# Patient Record
Sex: MALE | Race: OTHER | HISPANIC OR LATINO
[De-identification: names, ages, dates, MRNs, and addresses within clinical notes are randomized per-mention and may not be internally consistent; named-entity substitution may affect disease eponyms.]

---

## 2022-04-07 ENCOUNTER — TRANSCRIPTION ENCOUNTER (OUTPATIENT)
Age: 47
End: 2022-04-07

## 2022-04-08 PROBLEM — Z00.00 ENCOUNTER FOR PREVENTIVE HEALTH EXAMINATION: Status: ACTIVE | Noted: 2022-04-08

## 2022-04-12 ENCOUNTER — OUTPATIENT (OUTPATIENT)
Dept: OUTPATIENT SERVICES | Facility: HOSPITAL | Age: 47
LOS: 1 days | End: 2022-04-12
Payer: COMMERCIAL

## 2022-04-12 ENCOUNTER — APPOINTMENT (OUTPATIENT)
Dept: ORTHOPEDIC SURGERY | Facility: CLINIC | Age: 47
End: 2022-04-12
Payer: COMMERCIAL

## 2022-04-12 ENCOUNTER — TRANSCRIPTION ENCOUNTER (OUTPATIENT)
Age: 47
End: 2022-04-12

## 2022-04-12 VITALS
TEMPERATURE: 98 F | RESPIRATION RATE: 12 BRPM | OXYGEN SATURATION: 98 % | WEIGHT: 186.95 LBS | HEART RATE: 100 BPM | SYSTOLIC BLOOD PRESSURE: 137 MMHG | HEIGHT: 66 IN | DIASTOLIC BLOOD PRESSURE: 89 MMHG

## 2022-04-12 VITALS — WEIGHT: 175 LBS | HEIGHT: 65 IN | BODY MASS INDEX: 29.16 KG/M2

## 2022-04-12 DIAGNOSIS — S82.851A DISPLACED TRIMALLEOLAR FRACTURE OF RIGHT LOWER LEG, INITIAL ENCOUNTER FOR CLOSED FRACTURE: ICD-10-CM

## 2022-04-12 DIAGNOSIS — Z01.818 ENCOUNTER FOR OTHER PREPROCEDURAL EXAMINATION: ICD-10-CM

## 2022-04-12 LAB
ANION GAP SERPL CALC-SCNC: 8 MMOL/L — SIGNIFICANT CHANGE UP (ref 5–17)
BUN SERPL-MCNC: 13 MG/DL — SIGNIFICANT CHANGE UP (ref 7–23)
CALCIUM SERPL-MCNC: 9.2 MG/DL — SIGNIFICANT CHANGE UP (ref 8.4–10.5)
CHLORIDE SERPL-SCNC: 101 MMOL/L — SIGNIFICANT CHANGE UP (ref 96–108)
CO2 SERPL-SCNC: 27 MMOL/L — SIGNIFICANT CHANGE UP (ref 22–31)
CREAT SERPL-MCNC: 1.07 MG/DL — SIGNIFICANT CHANGE UP (ref 0.5–1.3)
EGFR: 87 ML/MIN/1.73M2 — SIGNIFICANT CHANGE UP
GLUCOSE SERPL-MCNC: 97 MG/DL — SIGNIFICANT CHANGE UP (ref 70–99)
HCT VFR BLD CALC: 44.8 % — SIGNIFICANT CHANGE UP (ref 39–50)
HGB BLD-MCNC: 15 G/DL — SIGNIFICANT CHANGE UP (ref 13–17)
MCHC RBC-ENTMCNC: 29.8 PG — SIGNIFICANT CHANGE UP (ref 27–34)
MCHC RBC-ENTMCNC: 33.5 GM/DL — SIGNIFICANT CHANGE UP (ref 32–36)
MCV RBC AUTO: 88.9 FL — SIGNIFICANT CHANGE UP (ref 80–100)
NRBC # BLD: 0 /100 WBCS — SIGNIFICANT CHANGE UP (ref 0–0)
PLATELET # BLD AUTO: 276 K/UL — SIGNIFICANT CHANGE UP (ref 150–400)
POTASSIUM SERPL-MCNC: 3.7 MMOL/L — SIGNIFICANT CHANGE UP (ref 3.5–5.3)
POTASSIUM SERPL-SCNC: 3.7 MMOL/L — SIGNIFICANT CHANGE UP (ref 3.5–5.3)
RBC # BLD: 5.04 M/UL — SIGNIFICANT CHANGE UP (ref 4.2–5.8)
RBC # FLD: 13 % — SIGNIFICANT CHANGE UP (ref 10.3–14.5)
SARS-COV-2 RNA SPEC QL NAA+PROBE: SIGNIFICANT CHANGE UP
SODIUM SERPL-SCNC: 136 MMOL/L — SIGNIFICANT CHANGE UP (ref 135–145)
WBC # BLD: 8.66 K/UL — SIGNIFICANT CHANGE UP (ref 3.8–10.5)
WBC # FLD AUTO: 8.66 K/UL — SIGNIFICANT CHANGE UP (ref 3.8–10.5)

## 2022-04-12 PROCEDURE — 85027 COMPLETE CBC AUTOMATED: CPT

## 2022-04-12 PROCEDURE — G0463: CPT

## 2022-04-12 PROCEDURE — 36415 COLL VENOUS BLD VENIPUNCTURE: CPT

## 2022-04-12 PROCEDURE — 93005 ELECTROCARDIOGRAM TRACING: CPT

## 2022-04-12 PROCEDURE — 87635 SARS-COV-2 COVID-19 AMP PRB: CPT

## 2022-04-12 PROCEDURE — 99204 OFFICE O/P NEW MOD 45 MIN: CPT

## 2022-04-12 PROCEDURE — 93010 ELECTROCARDIOGRAM REPORT: CPT

## 2022-04-12 PROCEDURE — 80048 BASIC METABOLIC PNL TOTAL CA: CPT

## 2022-04-12 NOTE — ASSESSMENT
[FreeTextEntry1] : nwb in cam boot - rx given\par xeroform to blisters\par given displacement, recommend surgical treatmetn. discussed pros/cons, risks/benefits, and recovery\par swelling laterally well controlled and appears amenable to surgery at this time\par all questions answered and would like to proceed\par \par The pros, cons, risks, benefits, alternatives have been discussed.  The risks include but are not limited to infection, bleeding, injury to small nerves and blood vessels, pain, stiffness, progression, over correction, under correction, recurrence, hardware failure, need for additional procedures, dvt, PE, amputation and death. Expected recovery time was discussed. All the patient's questions were answered and they would like to proceed.\par

## 2022-04-12 NOTE — H&P PST ADULT - HISTORY OF PRESENT ILLNESS
47 yo male reports fall injury 4/7/2022 with diagnosis of displaced right trimalleloar fracture.  Right foot splinted; using crutches.  Pain rates 6/10 at worst.  He is scheduled for right ankle open reduction internal fixation trimalleolar fracture distal tibiofibular disruption on 4/13/2022 @ [facility].

## 2022-04-12 NOTE — ASU PATIENT PROFILE, ADULT - FALL HARM RISK - RISK INTERVENTIONS

## 2022-04-12 NOTE — H&P PST ADULT - NSICDXFAMILYHX_GEN_ALL_CORE_FT
FAMILY HISTORY:  Mother  Still living? No  Family history of stomach cancer, Age at diagnosis: Age Unknown

## 2022-04-12 NOTE — PHYSICAL EXAM
[5___] : [facility] 5[unfilled]/5 [2+] : dorsalis pedis pulse: 2+ [Right] : right ankle [] : no achilles tendon tenderness [FreeTextEntry3] : multiple medial serosang fx blisters. lateral swelling well controlled w/ skin wrinkling present [FreeTextEntry9] : zachery buenrostro trimalleolar fx with subluxation

## 2022-04-12 NOTE — H&P PST ADULT - MEDICATION ADMINISTRATION INFO, PROFILE
Alert-The patient is alert, awake and responds to voice. The patient is oriented to time, place, and person. The triage nurse is able to obtain subjective information. no concerns

## 2022-04-12 NOTE — H&P PST ADULT - NSICDXPASTMEDICALHX_GEN_ALL_CORE_FT
PAST MEDICAL HISTORY:  COVID-19 vaccine series completed     Trimalleolar fracture of right ankle

## 2022-04-12 NOTE — H&P PST ADULT - PROBLEM SELECTOR PLAN 1
Right ankle open reduction internal fixation trimalleolar fracture distal tibiofibular disruption is planned for 4/13/2022  Covid testing today  pre op instructions were reviewed; best wishes offered

## 2022-04-12 NOTE — HISTORY OF PRESENT ILLNESS
[6] : 6 [Intermittent] : intermittent [de-identified] : 4/12/22: fall on stairs 5 days ago w/ ankle pain. went to  and placed in splint. no prior ankle probs. no dm/tob.  [] : no [FreeTextEntry1] : right fibula [FreeTextEntry3] : 4/7/22 [de-identified] : at night  [de-identified] : splint and crutches [de-identified] : urgent care [de-identified] : xray

## 2022-04-12 NOTE — H&P PST ADULT - ASSESSMENT
45 yo male is scheduled for right ankle open reduction internal fixation trimalleolar fracture distal tibiofibular disruption on 4/13/2022

## 2022-04-13 ENCOUNTER — APPOINTMENT (OUTPATIENT)
Dept: ORTHOPEDIC SURGERY | Facility: HOSPITAL | Age: 47
End: 2022-04-13
Payer: COMMERCIAL

## 2022-04-13 ENCOUNTER — TRANSCRIPTION ENCOUNTER (OUTPATIENT)
Age: 47
End: 2022-04-13

## 2022-04-13 ENCOUNTER — OUTPATIENT (OUTPATIENT)
Dept: OUTPATIENT SERVICES | Facility: HOSPITAL | Age: 47
LOS: 1 days | End: 2022-04-13
Payer: COMMERCIAL

## 2022-04-13 VITALS
OXYGEN SATURATION: 100 % | DIASTOLIC BLOOD PRESSURE: 92 MMHG | WEIGHT: 188.05 LBS | SYSTOLIC BLOOD PRESSURE: 145 MMHG | HEIGHT: 65 IN | RESPIRATION RATE: 20 BRPM | TEMPERATURE: 98 F | HEART RATE: 96 BPM

## 2022-04-13 VITALS
TEMPERATURE: 98 F | HEART RATE: 86 BPM | DIASTOLIC BLOOD PRESSURE: 83 MMHG | OXYGEN SATURATION: 96 % | SYSTOLIC BLOOD PRESSURE: 125 MMHG | RESPIRATION RATE: 16 BRPM

## 2022-04-13 DIAGNOSIS — S82.851A DISPLACED TRIMALLEOLAR FRACTURE OF RIGHT LOWER LEG, INITIAL ENCOUNTER FOR CLOSED FRACTURE: ICD-10-CM

## 2022-04-13 PROCEDURE — 73610 X-RAY EXAM OF ANKLE: CPT | Mod: 26,RT

## 2022-04-13 PROCEDURE — 27829 TREAT LOWER LEG JOINT: CPT | Mod: 59,RT

## 2022-04-13 PROCEDURE — 27829 TREAT LOWER LEG JOINT: CPT | Mod: RT

## 2022-04-13 PROCEDURE — 27822 TREATMENT OF ANKLE FRACTURE: CPT | Mod: RT

## 2022-04-13 PROCEDURE — C1713: CPT

## 2022-04-13 PROCEDURE — 76000 FLUOROSCOPY <1 HR PHYS/QHP: CPT

## 2022-04-13 PROCEDURE — 29515 APPLICATION SHORT LEG SPLINT: CPT | Mod: 59,RT

## 2022-04-13 DEVICE — IMPLANTABLE DEVICE: Type: IMPLANTABLE DEVICE | Status: FUNCTIONAL

## 2022-04-13 DEVICE — SCREW LOKG 3.5X12MM: Type: IMPLANTABLE DEVICE | Status: FUNCTIONAL

## 2022-04-13 DEVICE — PIN FIXATION TEMP 1.1MM SM: Type: IMPLANTABLE DEVICE | Status: FUNCTIONAL

## 2022-04-13 DEVICE — SCREW CORT LO PROF 3.5X12MM: Type: IMPLANTABLE DEVICE | Status: FUNCTIONAL

## 2022-04-13 RX ORDER — ACETAMINOPHEN 500 MG
1000 TABLET ORAL ONCE
Refills: 0 | Status: COMPLETED | OUTPATIENT
Start: 2022-04-13 | End: 2022-04-13

## 2022-04-13 RX ORDER — HYDROMORPHONE HYDROCHLORIDE 2 MG/ML
0.5 INJECTION INTRAMUSCULAR; INTRAVENOUS; SUBCUTANEOUS
Refills: 0 | Status: DISCONTINUED | OUTPATIENT
Start: 2022-04-13 | End: 2022-04-14

## 2022-04-13 RX ORDER — APREPITANT 80 MG/1
40 CAPSULE ORAL ONCE
Refills: 0 | Status: COMPLETED | OUTPATIENT
Start: 2022-04-13 | End: 2022-04-13

## 2022-04-13 RX ORDER — CEFAZOLIN SODIUM 1 G
2000 VIAL (EA) INJECTION ONCE
Refills: 0 | Status: COMPLETED | OUTPATIENT
Start: 2022-04-13 | End: 2022-04-13

## 2022-04-13 RX ORDER — ONDANSETRON 8 MG/1
4 TABLET, FILM COATED ORAL ONCE
Refills: 0 | Status: DISCONTINUED | OUTPATIENT
Start: 2022-04-13 | End: 2022-04-14

## 2022-04-13 RX ORDER — CHLORHEXIDINE GLUCONATE 213 G/1000ML
1 SOLUTION TOPICAL ONCE
Refills: 0 | Status: COMPLETED | OUTPATIENT
Start: 2022-04-13 | End: 2022-04-13

## 2022-04-13 RX ORDER — OXYCODONE HYDROCHLORIDE 5 MG/1
5 TABLET ORAL ONCE
Refills: 0 | Status: DISCONTINUED | OUTPATIENT
Start: 2022-04-13 | End: 2022-04-14

## 2022-04-13 RX ORDER — SODIUM CHLORIDE 9 MG/ML
1000 INJECTION, SOLUTION INTRAVENOUS
Refills: 0 | Status: DISCONTINUED | OUTPATIENT
Start: 2022-04-13 | End: 2022-04-14

## 2022-04-13 RX ORDER — ASPIRIN/CALCIUM CARB/MAGNESIUM 324 MG
1 TABLET ORAL
Qty: 30 | Refills: 0
Start: 2022-04-13 | End: 2022-05-12

## 2022-04-13 RX ADMIN — APREPITANT 40 MILLIGRAM(S): 80 CAPSULE ORAL at 15:21

## 2022-04-13 RX ADMIN — CHLORHEXIDINE GLUCONATE 1 APPLICATION(S): 213 SOLUTION TOPICAL at 15:22

## 2022-04-13 NOTE — ASU DISCHARGE PLAN (ADULT/PEDIATRIC) - NS MD DC FALL RISK RISK
For information on Fall & Injury Prevention, visit: [email or web address] OR  [email or web address] OR  https://www.cdc.gov/steadi/patient.html

## 2022-04-13 NOTE — BRIEF OPERATIVE NOTE - NSICDXBRIEFPROCEDURE_GEN_ALL_CORE_FT
PROCEDURES:  Open reduction and internal fixation of bimalleolar fracture of right ankle 13-Apr-2022 17:53:16  Karol Jacobs

## 2022-04-13 NOTE — ASU DISCHARGE PLAN (ADULT/PEDIATRIC) - CARE PROVIDER_API CALL
Arthur Bernal)  Orthopaedic Surgery  [address] [place] [address]  Phone: [phone]  Fax: [phone]  Follow Up Time: 1 week

## 2022-04-26 ENCOUNTER — APPOINTMENT (OUTPATIENT)
Dept: ORTHOPEDIC SURGERY | Facility: CLINIC | Age: 47
End: 2022-04-26
Payer: COMMERCIAL

## 2022-04-26 PROBLEM — S82.851A DISPLACED TRIMALLEOLAR FRACTURE OF RIGHT LOWER LEG, INITIAL ENCOUNTER FOR CLOSED FRACTURE: Chronic | Status: ACTIVE | Noted: 2022-04-12

## 2022-04-26 PROBLEM — Z92.29 PERSONAL HISTORY OF OTHER DRUG THERAPY: Chronic | Status: ACTIVE | Noted: 2022-04-12

## 2022-04-26 PROCEDURE — 99024 POSTOP FOLLOW-UP VISIT: CPT

## 2022-04-26 NOTE — HISTORY OF PRESENT ILLNESS
[Intermittent] : intermittent [Sudden] : sudden [5] : 5 [] : no [FreeTextEntry1] : right fibula [FreeTextEntry3] : 4/7/22 [de-identified] : at night  [de-identified] : splint and crutches [de-identified] : urgent care [de-identified] : xray [de-identified] : 4/12/22: fall on stairs 5 days ago w/ ankle pain. went to  and placed in splint. no prior ankle probs. no dm/tob. \par 4/13/22: ORIF ankle\par 4/26/22: no complaints. nwb in splint. denies f/c/drainage

## 2022-04-26 NOTE — ASSESSMENT
[FreeTextEntry1] : nwb in cam boot\par discussed wound care and blister care\par keep incision dry\par tylenol prn\par elevation\par f/up 2 wks w/ ankle xray

## 2022-04-26 NOTE — PHYSICAL EXAM
[5___] : [facility] 5[unfilled]/5 [2+] : dorsalis pedis pulse: 2+ [Right] : right foot and ankle [] : no calf tenderness [FreeTextEntry3] : medial fx blister - sanguinous. no sign of infx [FreeTextEntry9] : limited by pain

## 2022-05-10 ENCOUNTER — APPOINTMENT (OUTPATIENT)
Dept: ORTHOPEDIC SURGERY | Facility: CLINIC | Age: 47
End: 2022-05-10
Payer: COMMERCIAL

## 2022-05-10 VITALS — WEIGHT: 175 LBS | BODY MASS INDEX: 29.16 KG/M2 | HEIGHT: 65 IN

## 2022-05-10 PROCEDURE — 99024 POSTOP FOLLOW-UP VISIT: CPT

## 2022-05-10 PROCEDURE — 73610 X-RAY EXAM OF ANKLE: CPT | Mod: RT

## 2022-05-10 NOTE — IMAGING
[Right] : right ankle [No loss of surgical correlation. Bony alignment acceptable. Hardware in appropriate position] : No loss of surgical correlation. Bony alignment acceptable. Hardware in appropriate position

## 2022-05-10 NOTE — HISTORY OF PRESENT ILLNESS
[Sudden] : sudden [3] : 3 [2] : 2 [Dull/Aching] : dull/aching [Sharp] : sharp [Occasional] : occasional [de-identified] : 4/12/22: fall on stairs 5 days ago w/ ankle pain. went to  and placed in splint. no prior ankle probs. no dm/tob. \par \par 4/13/22: ORIF ankle\par \par 4/26/22: no complaints. nwb in splint. denies f/c/drainage\par \par 5/10/22: no complaints. nwb in boot. denies f/c/drainage [] : no [FreeTextEntry1] : right fibula [FreeTextEntry3] : 4/7/22 [de-identified] : at night  [de-identified] : boot and crutches [de-identified] : rajni  [de-identified] : xray

## 2022-05-10 NOTE — PHYSICAL EXAM
[Right] : right foot and ankle [2+] : dorsalis pedis pulse: 2+ [] : no calf tenderness [5___] : plantar flexion 5[unfilled]/5 [FreeTextEntry3] : medial fx blister - now healed [FreeTextEntry9] : limited by pain

## 2022-05-24 ENCOUNTER — APPOINTMENT (OUTPATIENT)
Dept: ORTHOPEDIC SURGERY | Facility: CLINIC | Age: 47
End: 2022-05-24
Payer: COMMERCIAL

## 2022-05-24 VITALS — BODY MASS INDEX: 29.16 KG/M2 | WEIGHT: 175 LBS | HEIGHT: 65 IN

## 2022-05-24 PROCEDURE — 73610 X-RAY EXAM OF ANKLE: CPT | Mod: RT

## 2022-05-24 PROCEDURE — 99024 POSTOP FOLLOW-UP VISIT: CPT

## 2022-05-24 NOTE — PHYSICAL EXAM
[Right] : right foot and ankle [5___] : [facility] 5[unfilled]/5 [2+] : dorsalis pedis pulse: 2+ [de-identified] : plantar flexion 20 degrees [TWNoteComboBox7] : dorsiflexion 5 degrees [] : no calf tenderness [FreeTextEntry3] : medial fx blister - now healed [FreeTextEntry9] : limited by pain

## 2022-05-24 NOTE — HISTORY OF PRESENT ILLNESS
[3] : 3 [2] : 2 [Sudden] : sudden [0] : 0 [Dull/Aching] : dull/aching [Sharp] : sharp [Occasional] : occasional [de-identified] : 4/12/22: fall on stairs 5 days ago w/ ankle pain. went to  and placed in splint. no prior ankle probs. no dm/tob. \par \par 4/13/22: ORIF ankle\par \par 4/26/22: no complaints. nwb in splint. denies f/c/drainage\par \par 5/10/22: no complaints. nwb in boot. denies f/c/drainage\par \par 5/24/22:  [] : no [FreeTextEntry1] : right fibula [FreeTextEntry3] : 4/7/22 [de-identified] : at night  [de-identified] : boot and crutches [de-identified] : rajni  [de-identified] : xray

## 2022-05-24 NOTE — HISTORY OF PRESENT ILLNESS
[3] : 3 [2] : 2 [Sudden] : sudden [0] : 0 [Dull/Aching] : dull/aching [Sharp] : sharp [Occasional] : occasional [de-identified] : 4/12/22: fall on stairs 5 days ago w/ ankle pain. went to  and placed in splint. no prior ankle probs. no dm/tob. \par \par 4/13/22: ORIF ankle\par \par 4/26/22: no complaints. nwb in splint. denies f/c/drainage\par \par 5/10/22: no complaints. nwb in boot. denies f/c/drainage\par \par 5/24/22:  [] : no [FreeTextEntry1] : right fibula [FreeTextEntry3] : 4/7/22 [de-identified] : at night  [de-identified] : boot and crutches [de-identified] : rajni  [de-identified] : xray

## 2022-05-24 NOTE — ASSESSMENT
[FreeTextEntry1] : wbat in cam boot\par begin PT\par ice/eelvate\par nsaids prn\par f/up 1 month w/ ankle xray

## 2022-05-24 NOTE — PHYSICAL EXAM
[Right] : right foot and ankle [5___] : [facility] 5[unfilled]/5 [2+] : dorsalis pedis pulse: 2+ [de-identified] : plantar flexion 20 degrees [TWNoteComboBox7] : dorsiflexion 5 degrees [] : no calf tenderness [FreeTextEntry3] : medial fx blister - now healed [FreeTextEntry9] : limited by pain

## 2022-06-28 ENCOUNTER — APPOINTMENT (OUTPATIENT)
Dept: ORTHOPEDIC SURGERY | Facility: CLINIC | Age: 47
End: 2022-06-28
Payer: COMMERCIAL

## 2022-06-28 VITALS — BODY MASS INDEX: 29.16 KG/M2 | HEIGHT: 65 IN | WEIGHT: 175 LBS

## 2022-06-28 PROCEDURE — 99024 POSTOP FOLLOW-UP VISIT: CPT

## 2022-06-28 PROCEDURE — 73610 X-RAY EXAM OF ANKLE: CPT | Mod: RT

## 2022-06-28 NOTE — ASSESSMENT
[FreeTextEntry1] : wbat in boot\par continue PT\par discussed syndesmotic screw removal - will discuss furhter at next visit\par f/up 3 wks w/ ankle xray

## 2022-06-28 NOTE — HISTORY OF PRESENT ILLNESS
[Sudden] : sudden [0] : 0 [Dull/Aching] : dull/aching [Sharp] : sharp [Occasional] : occasional [de-identified] : 4/12/22: fall on stairs 5 days ago w/ ankle pain. went to  and placed in splint. no prior ankle probs. no dm/tob. \par \par 4/13/22: ORIF ankle\par \par 4/26/22: no complaints. nwb in splint. denies f/c/drainage\par \par 5/10/22: no complaints. nwb in boot. denies f/c/drainage\par \par 5/24/22 no complaints. nwb in boot and crutches. denies f/c/drainage\par \par 06/28/2022;  No complaints. wb in boot with one crutch. going to PT [] : Post Surgical Visit: no [FreeTextEntry1] : right fibula [de-identified] : at night  [FreeTextEntry3] : 4/7/22 [de-identified] : boot and crutches [de-identified] :  [de-identified] : xray

## 2022-06-28 NOTE — PHYSICAL EXAM
[Right] : right foot and ankle [5___] : [facility] 5[unfilled]/5 [2+] : dorsalis pedis pulse: 2+ [] : no lateral malleolus tenderness [FreeTextEntry3] : medial fx blister - now healed [de-identified] : plantar flexion 30 degrees [TWNoteComboBox7] : dorsiflexion 10 degrees

## 2022-06-28 NOTE — IMAGING
Placed referral for Dr. Alyssa Dubose for pulmonology. Spoke with Alyssa, relayed message per VSP. Pt verbalized understanding. [Right] : right ankle [No loss of surgical correlation. Bony alignment acceptable. Hardware in appropriate position] : No loss of surgical correlation. Bony alignment acceptable. Hardware in appropriate position

## 2022-07-19 ENCOUNTER — APPOINTMENT (OUTPATIENT)
Dept: ORTHOPEDIC SURGERY | Facility: CLINIC | Age: 47
End: 2022-07-19

## 2022-07-19 VITALS — WEIGHT: 175 LBS | BODY MASS INDEX: 29.16 KG/M2 | HEIGHT: 65 IN

## 2022-07-19 PROCEDURE — 99214 OFFICE O/P EST MOD 30 MIN: CPT

## 2022-07-19 PROCEDURE — 73610 X-RAY EXAM OF ANKLE: CPT | Mod: RT

## 2022-07-19 NOTE — HISTORY OF PRESENT ILLNESS
[Sudden] : sudden [0] : 0 [Dull/Aching] : dull/aching [Sharp] : sharp [Occasional] : occasional [de-identified] : 4/12/22: fall on stairs 5 days ago w/ ankle pain. went to UC and placed in splint. no prior ankle probs. no dm/tob. \par \par 4/13/22: ORIF ankle\par \par 4/26/22: no complaints. nwb in splint. denies f/c/drainage\par \par 5/10/22: no complaints. nwb in boot. denies f/c/drainage\par \par 5/24/22 no complaints. nwb in boot and crutches. denies f/c/drainage\par \par 06/28/2022;  No complaints. wb in boot with one crutch. going to PT\par \par 7/19/2022: No sig pain. WB in boot. missed a couple wks of PT due to covid [] : Post Surgical Visit: no [FreeTextEntry1] : right fibula [FreeTextEntry3] : 4/7/22 [de-identified] : at night  [de-identified] : boot [de-identified] :  [de-identified] : xray

## 2022-07-19 NOTE — ASSESSMENT
[FreeTextEntry1] : wbat in boot\par rec resume PT\par discussed syndesmotic screw removal/VERONIKA. all questions answered and would like to proceed.\par \par The pros, cons, risks, benefits, and alternatives have been discussed.  The risks include but are not limited to infection, bleeding, injury to small nerves and blood vessels, pain, stiffness, progression, dvt, PE, amputation and death. Expected recovery time was discussed. All the patient's questions were answered and they would like to proceed.\par

## 2022-07-19 NOTE — PHYSICAL EXAM
[Right] : right foot and ankle [5___[facility]filled[facility]5 [2+] : dorsalis pedis pulse: 2+ [NL (40)] : plantar flexion 40 degrees [] : patient ambulates without assistive device [FreeTextEntry3] : medial fx blister - now healed [de-identified] : plantar flexion 30 degrees [TWNoteComboBox7] : dorsiflexion 10 degrees

## 2022-08-02 ENCOUNTER — OUTPATIENT (OUTPATIENT)
Dept: OUTPATIENT SERVICES | Facility: HOSPITAL | Age: 47
LOS: 1 days | End: 2022-08-02
Payer: COMMERCIAL

## 2022-08-02 VITALS
HEART RATE: 90 BPM | DIASTOLIC BLOOD PRESSURE: 80 MMHG | TEMPERATURE: 97 F | RESPIRATION RATE: 14 BRPM | HEIGHT: 66 IN | OXYGEN SATURATION: 98 % | SYSTOLIC BLOOD PRESSURE: 130 MMHG | WEIGHT: 195.99 LBS

## 2022-08-02 DIAGNOSIS — S82.851D DISPLACED TRIMALLEOLAR FRACTURE OF RIGHT LOWER LEG, SUBSEQUENT ENCOUNTER FOR CLOSED FRACTURE WITH ROUTINE HEALING: ICD-10-CM

## 2022-08-02 DIAGNOSIS — Z01.818 ENCOUNTER FOR OTHER PREPROCEDURAL EXAMINATION: ICD-10-CM

## 2022-08-02 DIAGNOSIS — Z98.890 OTHER SPECIFIED POSTPROCEDURAL STATES: Chronic | ICD-10-CM

## 2022-08-02 DIAGNOSIS — Z97.8 PRESENCE OF OTHER SPECIFIED DEVICES: ICD-10-CM

## 2022-08-02 PROCEDURE — G0463: CPT

## 2022-08-02 PROCEDURE — U0003: CPT

## 2022-08-02 PROCEDURE — U0005: CPT

## 2022-08-02 NOTE — H&P PST ADULT - HISTORY OF PRESENT ILLNESS
46 y/o male who had undergone  right ankle ORIF trimalleolar fracture presents for removal of hardwarew . scheduled for removal 9of hardware right ankle and manipulation under anesthesia  48 y/o male who had undergone right ankle ORIF trimalleolar fracture presents for removal of hardware. Denies any pain  scheduled for removal of hardware right ankle and manipulation under anesthesia on 8/5/22

## 2022-08-02 NOTE — H&P PST ADULT - ASSESSMENT
47 yo male is scheduled for removal of hardware right ankle  on 8/5/22 46yo male is scheduled for removal of hardware right ankle  on 8/5/22

## 2022-08-02 NOTE — H&P PST ADULT - NSICDXPASTMEDICALHX_GEN_ALL_CORE_FT
PAST MEDICAL HISTORY:  2019 novel coronavirus disease (COVID-19) 4/2022    COVID-19 vaccine series completed     Trimalleolar fracture of right ankle

## 2022-08-02 NOTE — H&P PST ADULT - DATE OF LAST VACCINATION
Asthma exacerbation-IV, labs, IVF, nebs, IV steroids, CXR, Magnesium.   Observation unit for repeat nebs, repeat IV steroids, and reevaluation 15-Mar-2021

## 2022-08-02 NOTE — H&P PST ADULT - PROBLEM SELECTOR PLAN 1
Removal of hardware right ankle 0n 8/5/22  COVID test today   Pre op testing instructions scheduled for Removal of hardware right ankle 0n 8/5/22  COVID test done today   Pre op testing instructions

## 2022-08-02 NOTE — H&P PST ADULT - NSICDXPASTSURGICALHX_GEN_ALL_CORE_FT
PAST SURGICAL HISTORY:  S/P ORIF (open reduction internal fixation) fracture right trimalleolar fracture 4/13/2022

## 2022-08-03 ENCOUNTER — TRANSCRIPTION ENCOUNTER (OUTPATIENT)
Age: 47
End: 2022-08-03

## 2022-08-03 PROBLEM — U07.1 COVID-19: Chronic | Status: ACTIVE | Noted: 2022-08-02

## 2022-08-03 LAB — SARS-COV-2 RNA SPEC QL NAA+PROBE: DETECTED

## 2022-08-05 ENCOUNTER — APPOINTMENT (OUTPATIENT)
Dept: ORTHOPEDIC SURGERY | Facility: HOSPITAL | Age: 47
End: 2022-08-05

## 2022-08-18 ENCOUNTER — TRANSCRIPTION ENCOUNTER (OUTPATIENT)
Age: 47
End: 2022-08-18

## 2022-08-19 ENCOUNTER — TRANSCRIPTION ENCOUNTER (OUTPATIENT)
Age: 47
End: 2022-08-19

## 2022-08-19 ENCOUNTER — APPOINTMENT (OUTPATIENT)
Dept: ORTHOPEDIC SURGERY | Facility: HOSPITAL | Age: 47
End: 2022-08-19

## 2022-08-19 ENCOUNTER — OUTPATIENT (OUTPATIENT)
Dept: OUTPATIENT SERVICES | Facility: HOSPITAL | Age: 47
LOS: 1 days | End: 2022-08-19
Payer: COMMERCIAL

## 2022-08-19 VITALS
OXYGEN SATURATION: 95 % | DIASTOLIC BLOOD PRESSURE: 76 MMHG | HEART RATE: 90 BPM | RESPIRATION RATE: 18 BRPM | SYSTOLIC BLOOD PRESSURE: 126 MMHG | TEMPERATURE: 98 F

## 2022-08-19 VITALS
SYSTOLIC BLOOD PRESSURE: 147 MMHG | HEIGHT: 65 IN | TEMPERATURE: 97 F | WEIGHT: 188.94 LBS | RESPIRATION RATE: 20 BRPM | OXYGEN SATURATION: 100 % | DIASTOLIC BLOOD PRESSURE: 88 MMHG | HEART RATE: 85 BPM

## 2022-08-19 DIAGNOSIS — Z01.818 ENCOUNTER FOR OTHER PREPROCEDURAL EXAMINATION: ICD-10-CM

## 2022-08-19 DIAGNOSIS — Z98.890 OTHER SPECIFIED POSTPROCEDURAL STATES: Chronic | ICD-10-CM

## 2022-08-19 DIAGNOSIS — S82.851D DISPLACED TRIMALLEOLAR FRACTURE OF RIGHT LOWER LEG, SUBSEQUENT ENCOUNTER FOR CLOSED FRACTURE WITH ROUTINE HEALING: ICD-10-CM

## 2022-08-19 PROCEDURE — 73610 X-RAY EXAM OF ANKLE: CPT | Mod: 26,RT

## 2022-08-19 PROCEDURE — 27860 FIXATION OF ANKLE JOINT: CPT | Mod: 59,RT

## 2022-08-19 PROCEDURE — 97161 PT EVAL LOW COMPLEX 20 MIN: CPT

## 2022-08-19 PROCEDURE — 20670 REMOVAL IMPLANT SUPERFICIAL: CPT

## 2022-08-19 PROCEDURE — 20680 REMOVAL OF IMPLANT DEEP: CPT | Mod: RT

## 2022-08-19 PROCEDURE — 76000 FLUOROSCOPY <1 HR PHYS/QHP: CPT

## 2022-08-19 RX ORDER — SODIUM CHLORIDE 9 MG/ML
1000 INJECTION, SOLUTION INTRAVENOUS
Refills: 0 | Status: DISCONTINUED | OUTPATIENT
Start: 2022-08-19 | End: 2022-08-19

## 2022-08-19 RX ORDER — IBUPROFEN 200 MG
1 TABLET ORAL
Qty: 20 | Refills: 0
Start: 2022-08-19

## 2022-08-19 RX ORDER — APREPITANT 80 MG/1
40 CAPSULE ORAL ONCE
Refills: 0 | Status: COMPLETED | OUTPATIENT
Start: 2022-08-19 | End: 2022-08-19

## 2022-08-19 RX ORDER — HYDROMORPHONE HYDROCHLORIDE 2 MG/ML
0.5 INJECTION INTRAMUSCULAR; INTRAVENOUS; SUBCUTANEOUS
Refills: 0 | Status: DISCONTINUED | OUTPATIENT
Start: 2022-08-19 | End: 2022-08-19

## 2022-08-19 RX ORDER — CEFAZOLIN SODIUM 1 G
2000 VIAL (EA) INJECTION ONCE
Refills: 0 | Status: COMPLETED | OUTPATIENT
Start: 2022-08-19 | End: 2022-08-19

## 2022-08-19 RX ORDER — CHLORHEXIDINE GLUCONATE 213 G/1000ML
1 SOLUTION TOPICAL ONCE
Refills: 0 | Status: COMPLETED | OUTPATIENT
Start: 2022-08-19 | End: 2022-08-19

## 2022-08-19 RX ORDER — ONDANSETRON 8 MG/1
4 TABLET, FILM COATED ORAL ONCE
Refills: 0 | Status: DISCONTINUED | OUTPATIENT
Start: 2022-08-19 | End: 2022-08-19

## 2022-08-19 RX ADMIN — SODIUM CHLORIDE 75 MILLILITER(S): 9 INJECTION, SOLUTION INTRAVENOUS at 12:08

## 2022-08-19 RX ADMIN — APREPITANT 40 MILLIGRAM(S): 80 CAPSULE ORAL at 08:13

## 2022-08-19 RX ADMIN — CHLORHEXIDINE GLUCONATE 1 APPLICATION(S): 213 SOLUTION TOPICAL at 08:13

## 2022-08-19 NOTE — ASU DISCHARGE PLAN (ADULT/PEDIATRIC) - NS MD DC FALL RISK RISK
For information on Fall & Injury Prevention, visit: [email or web address] OR  [email or web address] OR  https://www.cdc.gov/steadi/patient.html
You can access the FollowMyHealth Patient Portal offered by [facility] by registering at the following website: [email or web address]. By joining [employer]’s FollowMyHealth portal, you will also be able to view your health information using other applications (apps) compatible with our system.

## 2022-08-19 NOTE — BRIEF OPERATIVE NOTE - NSICDXBRIEFPROCEDURE_GEN_ALL_CORE_FT
PROCEDURES:  Removal of implanted or hardware from ankle 19-Aug-2022 11:36:57 Right syndesmotic screw Karol Jacobs

## 2022-08-19 NOTE — ASU DISCHARGE PLAN (ADULT/PEDIATRIC) - ASU DC SPECIAL INSTRUCTIONSFT
May walk with full weight on right foot in a surgical shoe as tolerated.   Elevate right foot on blankets above heart level (toes above the nose) for 1 hour 3 times a day.  Apply ice pack to  foot for 30 minutes every 3 hours daily.  Keep bandage clean and dry daily.  Cover right foot in shower daily with plastic bag & tape ( or cast bag ) to keep dry.  Wear protective shoe on  foot daily  See the Surgeon in the office in 7-10 days.  Call the Surgeon for fever, severe foot pain. May walk with full weight on right foot in a surgical shoe as tolerated.   Elevate right foot on blankets above heart level (toes above the nose) for 1 hour 3 times a day.  Apply ice pack to  foot for 30 minutes every 3 hours daily.  Keep bandage clean and dry daily. Remove in 3 days & may shower.  Wear protective shoe on  foot daily  See the Surgeon in the office in 7-10 days.  Call the Surgeon for fever, severe foot pain.

## 2022-08-19 NOTE — ASU DISCHARGE PLAN (ADULT/PEDIATRIC) - CALL YOUR DOCTOR IF YOU HAVE ANY OF THE FOLLOWING:
Bleeding that does not stop/Swelling that gets worse/Pain not relieved by Medications/Fever greater than (need to indicate Fahrenheit or Celsius)/Wound/Surgical Site with redness, or foul smelling discharge or pus/Nausea and vomiting that does not stop Bleeding that does not stop/Swelling that gets worse/Pain not relieved by Medications/Fever greater than (need to indicate Fahrenheit or Celsius)/Wound/Surgical Site with redness, or foul smelling discharge or pus/Numbness, tingling, color or temperature change to extremity/Nausea and vomiting that does not stop/Inability to tolerate liquids or foods/Increased irritability or sluggishness

## 2022-08-19 NOTE — PHYSICAL THERAPY INITIAL EVALUATION ADULT - ADDITIONAL COMMENTS
Pt lives with wife in apartment with full flight down to enter with b/l handrails. Pt was independent prior to surgery. Was given cane adjusted to appropriate height.

## 2022-08-19 NOTE — ASU DISCHARGE PLAN (ADULT/PEDIATRIC) - ACTIVITY LEVEL
No excercise/No sports/gym/Weight bearing as tolerated/Elevate extremity No excercise/No heavy lifting/No sports/gym/No weight bearing/Weight bearing as tolerated/Elevate extremity/No tub baths

## 2022-08-19 NOTE — ASU PATIENT PROFILE, ADULT - FALL HARM RISK - RISK INTERVENTIONS

## 2022-08-19 NOTE — BRIEF OPERATIVE NOTE - NSICDXBRIEFPREOP_GEN_ALL_CORE_FT
PRE-OP DIAGNOSIS:  Closed right ankle fracture, with routine healing, subsequent encounter 19-Aug-2022 11:38:50  Karol Jacobs

## 2022-08-19 NOTE — ASU DISCHARGE PLAN (ADULT/PEDIATRIC) - CARE PROVIDER_API CALL
Arthur Bernal)  Orthopaedic Surgery  [address] [place] [address]  Phone: [phone]  Fax: [phone]  Follow Up Time: 1 week   Yes

## 2022-08-19 NOTE — PHYSICAL THERAPY INITIAL EVALUATION ADULT - PERTINENT HX OF CURRENT PROBLEM, REHAB EVAL
48 y/o male who had undergone right ankle ORIF trimalleolar fracture presents for removal of hardware. Denies any pain  scheduled for removal of hardware right ankle and manipulation under anesthesia on 8/5/22

## 2022-08-30 ENCOUNTER — APPOINTMENT (OUTPATIENT)
Dept: ORTHOPEDIC SURGERY | Facility: CLINIC | Age: 47
End: 2022-08-30

## 2022-08-30 DIAGNOSIS — S82.851D DISPLACED TRIMALLEOLAR FRACTURE OF RIGHT LOWER LEG, SUBSEQUENT ENCOUNTER FOR CLOSED FRACTURE WITH ROUTINE HEALING: ICD-10-CM

## 2022-08-30 PROCEDURE — 99024 POSTOP FOLLOW-UP VISIT: CPT

## 2022-08-30 PROCEDURE — 73610 X-RAY EXAM OF ANKLE: CPT | Mod: RT

## 2022-08-30 NOTE — PHYSICAL EXAM
[Right] : right foot and ankle [NL (40)] : plantar flexion 40 degrees [5___] : plantar flexion 5[unfilled]/5 [2+] : dorsalis pedis pulse: 2+ [] : patient ambulates without assistive device [TWNoteComboBox7] : dorsiflexion 15 degrees

## 2022-08-30 NOTE — HISTORY OF PRESENT ILLNESS
[Sudden] : sudden [0] : 0 [Dull/Aching] : dull/aching [Sharp] : sharp [Occasional] : occasional [de-identified] : 4/12/22: fall on stairs 5 days ago w/ ankle pain. went to UC and placed in splint. no prior ankle probs. no dm/tob. \par \par 4/13/22: ORIF ankle\par \par 4/26/22: no complaints. nwb in splint. denies f/c/drainage\par \par 5/10/22: no complaints. nwb in boot. denies f/c/drainage\par \par 5/24/22 no complaints. nwb in boot and crutches. denies f/c/drainage\par \par 06/28/2022;  No complaints. wb in boot with one crutch. going to PT\par \par 7/19/2022: No sig pain. WB in boot. missed a couple wks of PT due to covid\par \par 8/19/22: R ankle BOYD/VERONIKA\par \par 08/30/2022:  no complaints. walking in reg shoes. Patient denies fevers, chills, or drainage.\par \par  [] : Post Surgical Visit: no [FreeTextEntry1] : right fibula [FreeTextEntry3] : 4/7/22 [de-identified] : at night  [de-identified] : boot [de-identified] :  [de-identified] : xray

## 2022-08-30 NOTE — ASSESSMENT
[FreeTextEntry1] : wbat\par PT\par increase activity as ericka\par f/up 8 wks w/ ankle xray\par may return to work

## 2024-03-15 NOTE — H&P PST ADULT - NSANTHOSAYNRD_GEN_A_CORE
Statement Selected
No. ALYCIA screening performed.  STOP BANG Legend: 0-2 = LOW Risk; 3-4 = INTERMEDIATE Risk; 5-8 = HIGH Risk

## 2024-08-08 NOTE — ASU DISCHARGE PLAN (ADULT/PEDIATRIC) - CARE COORDINATION DISCHARGE PLANNING
2 cm bladder tumor, lg and papillary appearing superior to the right ureteral orifice. b/l UOs not involved. erythematous patches along dome likely 2/2 cystitis and irrigation, however biopsy obtained Additional Safety/Bands: No

## 2025-05-12 NOTE — H&P PST ADULT - NSCAFFEAMTFREQ_GEN_ALL_CORE_SD
Edmar's INR today is therapeutic at 2.5 on 47.5 mg of Warfarin weekly.  Reason for anticoagulation: atrial fibrillation  INR Range: 2.0-3.0  Last INR: 1.4 on 5/5/25    Patient reports no abnormal findings related to being on Warfarin. Reports no medication or dietary changes. Verified correct previous Warfarin dosing.  Patient was advised to take the following Warfarin dosing: Take 5 mg on Sunday, Tuesday, Thursday and 7.5 mg all other days (totaling 45 mg weekly, which is a 5.3% decrease).  See Anticoagulation Flowsheet.  Date of next INR: 5/19/25 in 1 week on home monitor.  No further questions/concerns.      Onsite billing provider is  Dr. Durdevic .    Advised to contact the clinic at [phone] with the following:   Medication changes: New, dose change, advised to stop, especially antibiotics/steroids  Procedures planned: Surgical/dental/injections, or if you are advised to hold your Warfarin  Bruising/bleeding  ER visits/Urgent Care visits    Staff message was sent.    1-2 cups/cans per day

## (undated) DEVICE — DRSG COBAN 4"

## (undated) DEVICE — SUT POLYSORB 2-0 30" GS-10 UNDYED

## (undated) DEVICE — DRSG WEBRIL 4"

## (undated) DEVICE — DRSG KLING 2"

## (undated) DEVICE — CUFF TOURNIQUET 34" DUAL PORT W PLC

## (undated) DEVICE — DRSG ACE BANDAGE 4"

## (undated) DEVICE — BLANKET WARMER UPPER ADULT

## (undated) DEVICE — GLV 7.5 PROTEXIS

## (undated) DEVICE — DRSG XEROFORM 5"

## (undated) DEVICE — SUT VICRYL 3-0 27" RB-1 UNDYED

## (undated) DEVICE — DRSG STERISTRIPS 0.5X4"

## (undated) DEVICE — DRAPE C ARMOUR

## (undated) DEVICE — DRAPE C-ARM 42X64 W/BANDS

## (undated) DEVICE — SUT POLYSORB 0 30" GS-10 UNDYED

## (undated) DEVICE — DRILL BIT WRIGHT MEDICAL 2.5MM

## (undated) DEVICE — DRAPE 3/4 SHEET 52X76"

## (undated) DEVICE — SUT MONOSOF 3-0 18" P-12

## (undated) DEVICE — SPONGE LAP PAD W RING 18X18"

## (undated) DEVICE — SURGIPHOR STERILE WOUND IRR POVIDONE IODINE

## (undated) DEVICE — IRRISEPT JET LAVAGE W 0.05 PCT CHG

## (undated) DEVICE — DRSG ACE BANDAGE 6"

## (undated) DEVICE — DRAPE TOP SHEET 53"X101"

## (undated) DEVICE — BLADE SAFETY LOCK #15

## (undated) DEVICE — DRAPE IOBAN 23X23"

## (undated) DEVICE — GOWN TRIMAX XXL

## (undated) DEVICE — DRAPE TOWEL BLUE 17" X 24"

## (undated) DEVICE — WRAP COMPRESSION CALF MED

## (undated) DEVICE — PACK FOOT CUST

## (undated) DEVICE — DRSG WEBRIL 6"

## (undated) DEVICE — ELCTR EDGE BOVIE INSULATED BLADE TIP 2.75"

## (undated) DEVICE — PACK LOWER EXTREMITY

## (undated) DEVICE — SYR ASEPTO

## (undated) DEVICE — DRSG STOCKINETTE TUBULAR COTTON 2PLY 6X72"

## (undated) DEVICE — SUT MONOCRYL 4-0 18" P-3 UNDYED

## (undated) DEVICE — GLV 7.5 ESTEEM BLUE

## (undated) DEVICE — BLANKET WARMER FULL ADULT